# Patient Record
Sex: MALE | Race: WHITE | NOT HISPANIC OR LATINO | Employment: OTHER | RURAL
[De-identification: names, ages, dates, MRNs, and addresses within clinical notes are randomized per-mention and may not be internally consistent; named-entity substitution may affect disease eponyms.]

---

## 2019-10-04 ENCOUNTER — TRANSCRIBE ORDERS (OUTPATIENT)
Dept: PHYSICAL THERAPY | Facility: CLINIC | Age: 74
End: 2019-10-04

## 2019-10-04 DIAGNOSIS — M54.50 LOW BACK PAIN, UNSPECIFIED BACK PAIN LATERALITY, UNSPECIFIED CHRONICITY, UNSPECIFIED WHETHER SCIATICA PRESENT: Primary | ICD-10-CM

## 2019-10-21 ENCOUNTER — TREATMENT (OUTPATIENT)
Dept: PHYSICAL THERAPY | Facility: CLINIC | Age: 74
End: 2019-10-21

## 2019-10-21 DIAGNOSIS — M54.50 CHRONIC LOW BACK PAIN, UNSPECIFIED BACK PAIN LATERALITY, UNSPECIFIED WHETHER SCIATICA PRESENT: Primary | ICD-10-CM

## 2019-10-21 DIAGNOSIS — G89.29 CHRONIC LOW BACK PAIN, UNSPECIFIED BACK PAIN LATERALITY, UNSPECIFIED WHETHER SCIATICA PRESENT: Primary | ICD-10-CM

## 2019-10-21 PROCEDURE — 97110 THERAPEUTIC EXERCISES: CPT | Performed by: PHYSICAL THERAPIST

## 2019-10-21 PROCEDURE — 97162 PT EVAL MOD COMPLEX 30 MIN: CPT | Performed by: PHYSICAL THERAPIST

## 2019-10-21 NOTE — PROGRESS NOTES
Physical Therapy Initial Evaluation and Plan of Care        Patient: Giles Parker   : 1945  Diagnosis/ICD-10 Code:  Chronic low back pain, unspecified back pain laterality, unspecified whether sciatica present [M54.5, G89.29]  Referring practitioner: Nury Ozuna PA-C  Date of Initial Visit: 10/21/2019  Today's Date: 10/21/2019  Patient seen for 1 sessions    Next MD appt: PRN .  Recertification: 2019         Subjective Questionnaire: Oswestry: 23/50 = 46%      Subjective Evaluation    History of Present Illness  Onset date: Chronic, >20 years.  Mechanism of injury: None recent.    Subjective comment: Patient reports he has hd always had low back issues. He reports he had sciatic nerve pain for about 10 years. he reports now he has pain in his R flank and next to his spine. He reports he has neverhad recent PT for his back, but sees the chiropractor x2 in 1 week and it helps while he is there biut effects aren't lasitng so he quite going. He reports he is going back to a local chiropractor here again.  Patient Occupation: Retired Quality of life: fair    Pain  Current pain ratin  At best pain ratin  At worst pain ratin  Location: Low back  Quality: tight, pulling and discomfort  Aggravating factors: ambulation, prolonged positioning and movement    Social Support  Lives in: one-story house  Lives with: spouse    Diagnostic Tests  X-ray: abnormal    Treatments  Previous treatment: chiropractic and physical therapy  Patient Goals  Patient goals for therapy: decreased pain, increased motion, increased strength and independence with ADLs/IADLs  Patient goal: Patient would like to get rid of the pain in the back and be able to walk without it bothering him.           Objective       Static Posture     Head  Forward.    Shoulders  Rounded.    Thoracic Spine  Hyperkyphosis.    Lumbar Spine   Flattened.     Pelvis   Pelvis (Right): Shifted.     Comments  R anterior ROT innominate,  "1/4\" short on R LE.    Postural Observations  Seated posture: poor  Standing posture: fair        Palpation     Right Tenderness of the rectus abdominus.     Neurological Testing     Sensation     Lumbar   Left   Intact: hot/cold discrimination  Diminished: light touch    Right   Intact: hot/cold discrimination  Diminished: light touch    Reflexes   Left   Patellar (L4): trace (1+)  Achilles (S1): absent (0)    Right   Patellar (L4): trace (1+)  Achilles (S1): absent (0)    Additional Neurological Details  neuropaythy B LEs    Active Range of Motion     Lumbar   Flexion: 80 degrees   Extension: 10 degrees   Left lateral flexion: Active left lumbar lateral flexion: 50% of range. with pain  Right lateral flexion: Active right lumbar lateral flexion: 50% of range.   Left rotation: Active left lumbar rotation: 75% of range.   Right rotation: Active right lumbar rotation: 50% of range.     Strength/Myotome Testing     Left Hip   Planes of Motion   Flexion: 4  Extension: 4-  Abduction: 4+  Adduction: 4+    Right Hip   Planes of Motion   Flexion: 4-  Extension: 4-  Abduction: 4+  Adduction: 4+    Left Knee   Flexion: 5  Extension: 4+    Right Knee   Flexion: 5  Extension: 5    Tests     Lumbar     Left   Negative crossed SLR, passive SLR, quadrant and valsalva.     Right   Negative crossed SLR, passive SLR, quadrant and valsalva.     Left Pelvic Girdle/Sacrum   Negative: sacrum compression and gapping.     Right Pelvic Girdle/Sacrum   Negative: sacrum compression and gapping.     Left Hip   Negative YOLY, FADIR, piriformis, SI compression and SI distraction.   SLR: Negative.     Right Hip   Positive YOLY and SI compression.   Negative FADIR, piriformis and SI distraction.   SLR: Negative.     Additional Tests Details  Karri's Symptoms  1) Tenderness- negative  2) Simulation- negative  3) Distraction- negative  4) Regional- negative  5) Overreaction- negative    Ambulation   Weight-Bearing Status   Weight-Bearing Status " "(Left): weight-bearing as tolerated   Weight-Bearing Status (Right): weight-bearing as tolerated    Assistive device used: none    Quality of Movement During Gait   Trunk  Forward lean.     Comments   FWB, non-antalgic gait, no distress, no significant gait deviation.         Assessment & Plan     Assessment  Impairments: abnormal gait, abnormal or restricted ROM, activity intolerance, lacks appropriate home exercise program and pain with function  Assessment details: Patient presents with chronic low back pain, has had chiropractic treatment sin the past with no real changes but has currently started seeing a chiropractor again. Today had a R anterior ROT innominate which corrected easily with HS MET. Patient was also issued HEP today. Patient defers aquatics.  Prognosis: fair  Prognosis details: Barriers to Rehab: Include significant or possible arthritic/degenerative changes that have occurred within the spine, The chronicity of this issue, The patient's obesity, The patient's generally deconditioned state.    Safety Issues: Cancer history, No estim/US    Functional Limitations: carrying objects, lifting, sleeping, uncomfortable because of pain, sitting, standing and unable to perform repetitive tasks  Goals  Plan Goals: Short Term Goals:  1) I with HEP and have additions/changes by next recertification    2) Patient able to show proper log roll technique.    3) Patient to be more aware of posture and posture correction techniques    4) AROM for lumbar extension >= 20°     5) AROm B Lumbar SB/ROT >= 75% of range.      Long Term Goals:  1) Patient to have AROM for the lumbar spine all WNL, no increase in pain (extension >= 20°).    2) B LE 5/5.    3) Patient able to perform 20 Bridges with UE \"X\"  with no increase in pain.    4) Patient able to perform 5 minutes of seated DD activity with no increas ein pain and good posture.    5) Patient able to show proper lifting technique floor to waist with no increase in " pain.    6) Patient able to show proper ergonomics for lawn care activities.    7) I with final land HEP.    8) D/C with a final HEP and free 30 day fitness formula [facility]      Plan  Therapy options: will be seen for skilled physical therapy services  Planned modality interventions: cryotherapy and thermotherapy (hydrocollator packs)  Planned therapy interventions: abdominal trunk stabilization, body mechanics training, flexibility, functional ROM exercises, home exercise program, transfer training, therapeutic activities, stretching, strengthening, spinal/joint mobilization, soft tissue mobilization, postural training, manual therapy and IADL retraining  Duration in visits: 20  Treatment plan discussed with: patient  Plan details: Progress ROM, strength, core stab, body mechanics to an I HEP that the patient can continue for long term pain management and spinal protection.       Other therapeutic activities and/or exercises will be prescribed depending on the patients progress or lack there of.    Visit Diagnoses:    ICD-10-CM ICD-9-CM   1. Chronic low back pain, unspecified back pain laterality, unspecified whether sciatica present M54.5 724.2    G89.29 338.29       Timed:  Manual Therapy:         mins  27967;  Therapeutic Exercise:    14     mins  05122;     Neuromuscular Kisha:        mins  62390;    Therapeutic Activity:          mins  58681;     Gait Training:           mins  81850;     Ultrasound:          mins  96976;    Electrical Stimulation:         mins  32282 ( );    Untimed:  Electrical Stimulation:         mins  85869 ( );  Mechanical Traction:         mins  30280;     Timed Treatment:   14   mins   Total Treatment:     58   mins    PT SIGNATURE: Geno Gifford PT DPT, [facility]   DATE TREATMENT INITIATED: 10/21/2019    Initial Certification  Certification Period: 1/19/2020  I certify that the therapy services are furnished while this patient is under my care.  The services  outlined above are required by this patient, and will be reviewed every 90 days.     PHYSICIAN: Nury Ozuna PA-C      DATE:     Please sign and return via fax to  .. Thank you, [facility].

## 2019-10-23 ENCOUNTER — TREATMENT (OUTPATIENT)
Dept: PHYSICAL THERAPY | Facility: CLINIC | Age: 74
End: 2019-10-23

## 2019-10-23 DIAGNOSIS — M54.50 CHRONIC LOW BACK PAIN, UNSPECIFIED BACK PAIN LATERALITY, UNSPECIFIED WHETHER SCIATICA PRESENT: Primary | ICD-10-CM

## 2019-10-23 DIAGNOSIS — G89.29 CHRONIC LOW BACK PAIN, UNSPECIFIED BACK PAIN LATERALITY, UNSPECIFIED WHETHER SCIATICA PRESENT: Primary | ICD-10-CM

## 2019-10-23 PROCEDURE — 97110 THERAPEUTIC EXERCISES: CPT | Performed by: PHYSICAL THERAPIST

## 2019-10-23 NOTE — PROGRESS NOTES
"Physical Therapy Daily Progress Note    Patient: Giles Parker   : 1945  Diagnosis/ICD-10 Code:     Diagnosis Plan   1. Chronic low back pain, unspecified back pain laterality, unspecified whether sciatica present       Referring practitioner: Nury Ozuna PA-C  Date of Initial Visit: Type: THERAPY  Noted: 10/21/2019  Today's Date: 10/23/2019  Patient seen for 2 sessions      PT Recheck Due: 2019  PT MD Visit: PRN    CANCER HISTORY- NO ESTIM or US       Giles Parker        Subjective Evaluation    History of Present Illness    Subjective comment: denies pain today but states that he is sore from the exercising, states that he can really feel that he did something the other day. reports that he usually rides the Pro II bike every day for 30 minutes for cardiovascular and pulmonary health Pain  Current pain ratin             Objective   See Exercise, Manual, and Modality Logs for complete treatment.       Assessment & Plan     Assessment  Assessment details: Pt instructed to arrive to treatment early so that he can complete his normal routine of riding the PRO II Bike for 30 minutes for his cardiovascular and pulmonary health each visit. Added tband hip abd and low level bridges as well as BKLL to current HEP program. Good effort throughout. L LE appears shorter with L anterior rotation of ASIS. Easily corrects with HS MET on L LE    Goals  Plan Goals: Short Term Goals:  1) I with HEP and have additions/changes by next recertification (met)    2) Patient able to show proper log roll technique. (met)    3) Patient to be more aware of posture and posture correction techniques    4) AROM for lumbar extension >= 20°     5) AROm B Lumbar SB/ROT >= 75% of range.      Long Term Goals:  1) Patient to have AROM for the lumbar spine all WNL, no increase in pain (extension >= 20°).    2) B LE 5/5.    3) Patient able to perform 20 Bridges with UE \"X\"  with no increase in pain.    4) Patient able to " perform 5 minutes of seated DD activity with no increas ein pain and good posture.    5) Patient able to show proper lifting technique floor to waist with no increase in pain.    6) Patient able to show proper ergonomics for lawn care activities.    7) I with final land HEP.    8) D/C with a final HEP and free 30 day fitness formula [employer]    Plan  Plan details: Continue to progress core stabilization exercises. Add sit to/from stand with Lx extension       Progress strengthening /stabilization /functional activity            Timed:  Manual Therapy:         mins  53975;  Therapeutic Exercise:    45     mins  51375;   Aquatic Therex :        mins  06061    Neuromuscular Kisha:        mins  60728;    Therapeutic Activity:          mins  14389;     Gait Training:           mins  57984;     Ultrasound:          mins  68954;    Electrical Stimulation:         mins  77424 ( );    Untimed:  Electrical Stimulation:         mins  65592 ( );  Mechanical Traction:         mins  78313;     Timed Treatment:   45   mins   Total Treatment:     45   mins  Maribeth Botello PTA  Physical Therapist Assistant

## 2019-10-29 ENCOUNTER — TREATMENT (OUTPATIENT)
Dept: PHYSICAL THERAPY | Facility: CLINIC | Age: 74
End: 2019-10-29

## 2019-10-29 DIAGNOSIS — M54.50 CHRONIC LOW BACK PAIN, UNSPECIFIED BACK PAIN LATERALITY, UNSPECIFIED WHETHER SCIATICA PRESENT: Primary | ICD-10-CM

## 2019-10-29 DIAGNOSIS — G89.29 CHRONIC LOW BACK PAIN, UNSPECIFIED BACK PAIN LATERALITY, UNSPECIFIED WHETHER SCIATICA PRESENT: Primary | ICD-10-CM

## 2019-10-29 PROCEDURE — 97110 THERAPEUTIC EXERCISES: CPT | Performed by: PHYSICAL THERAPIST

## 2019-10-29 NOTE — PROGRESS NOTES
"   Physical Therapy Daily Progress Note      Patient: Giles Parker   : 1945  Referring practitioner: Nury Ozuna PA-C  Date of Initial Visit: Type: THERAPY  Noted: 10/21/2019  Today's Date: 10/29/2019  Patient seen for 3 sessions    Next MD appt: PRN .  Recertification: 2019           Subjective     Objective   See Exercise, Manual, and Modality Logs for complete treatment.       Assessment & Plan     Assessment  Assessment details: Pt has met 1 and 2 short term goals. No new goals met at this time. Pt progressing toward bridging goal and working on upright posturing. Pt tolerated new ex on esha disc this date with LAQ and trunk rotation, shoulder rows.    Goals  Plan Goals: Plan Goals: Short Term Goals:  1) I with HEP and have additions/changes by next recertification (met)    2) Patient able to show proper log roll technique. (met)    3) Patient to be more aware of posture and posture correction techniques (progressing)    4) AROM for lumbar extension >= 20° (ongoing/progressing)    5) AROm B Lumbar SB/ROT >= 75% of range.(ongoing/progressing)      Long Term Goals:  1) Patient to have AROM for the lumbar spine all WNL, no increase in pain (extension >= 20°).    2) B LE 5/5.    3) Patient able to perform 20 Bridges with UE \"X\"  with no increase in pain.(progressing/ongoing)    4) Patient able to perform 5 minutes of seated DD activity with no increas ein pain and good posture.(progressing/ongoing)    5) Patient able to show proper lifting technique floor to waist with no increase in pain.    6) Patient able to show proper ergonomics for lawn care activities.    7) I with final land HEP.    8) D/C with a final HEP and free 30 day fitness formula [employer]     Plan  Plan details: Add marching to esha disc         Visit Diagnoses:    ICD-10-CM ICD-9-CM   1. Chronic low back pain, unspecified back pain laterality, unspecified whether sciatica present M54.5 724.2    G89.29 338.29 "       Progress per Plan of Care and Progress strengthening /stabilization /functional activity           Timed:  Manual Therapy:         mins  60541;  Therapeutic Exercise:    54     mins  56155;     Neuromuscular Kisha:        mins  97219;    Therapeutic Activity:          mins  68130;     Gait Training:           mins  04048;     Ultrasound:          mins  40126;    Electrical Stimulation:         mins  72017 ( );    Untimed:  Electrical Stimulation:         mins  95483 ( );  Mechanical Traction:         mins  22299;     Timed Treatment:  54    mins   Total Treatment:     54   mins  Pat Weller PTA  Physical Therapist Assistant

## 2019-10-31 ENCOUNTER — TREATMENT (OUTPATIENT)
Dept: PHYSICAL THERAPY | Facility: CLINIC | Age: 74
End: 2019-10-31

## 2019-10-31 DIAGNOSIS — M54.50 CHRONIC LOW BACK PAIN, UNSPECIFIED BACK PAIN LATERALITY, UNSPECIFIED WHETHER SCIATICA PRESENT: Primary | ICD-10-CM

## 2019-10-31 DIAGNOSIS — G89.29 CHRONIC LOW BACK PAIN, UNSPECIFIED BACK PAIN LATERALITY, UNSPECIFIED WHETHER SCIATICA PRESENT: Primary | ICD-10-CM

## 2019-10-31 PROCEDURE — 97110 THERAPEUTIC EXERCISES: CPT | Performed by: PHYSICAL THERAPIST

## 2019-10-31 NOTE — PROGRESS NOTES
"   Physical Therapy Daily Progress Note      Patient: Giles Parker   : 1945  Referring practitioner: Nury Ozuna PA-C  Date of Initial Visit: Type: THERAPY  Noted: 10/21/2019  Today's Date: 10/31/2019  Patient seen for 4 sessions    Next MD appt: PRN .  Recertification: 2019         Giles Parker reports: \"some\" improvement.        Subjective Evaluation    History of Present Illness    Subjective comment: Patient rpeorts no real pain today, mainly stiffness.Pain  Current pain ratin           Objective   See Exercise, Manual, and Modality Logs for complete treatment.       Assessment & Plan     Assessment  Assessment details: Improving overall overall postural awareness. Cueing needed to activate middle and lower traps versus upper traps with scap stab/posture there ex. Improving overall postural awareness with less cueing needed.    Goals  Plan Goals: Plan Goals: Short Term Goals:  1) I with HEP and have additions/changes by next recertification (met)    2) Patient able to show proper log roll technique. (met)    3) Patient to be more aware of posture and posture correction techniques (partially met/progressing)    4) AROM for lumbar extension >= 20° (ongoing/progressing)    5) AROm B Lumbar SB/ROT >= 75% of range.(ongoing/progressing)      Long Term Goals:  1) Patient to have AROM for the lumbar spine all WNL, no increase in pain (extension >= 20°).    2) B LE 5/5.    3) Patient able to perform 20 Bridges with UE \"X\"  with no increase in pain.(progressing/ongoing)    4) Patient able to perform 5 minutes of seated DD activity with no increase in pain and good posture.(met/ongoing)    5) Patient able to show proper lifting technique floor to waist with no increase in pain.    6) Patient able to show proper ergonomics for lawn care activities.    7) I with final land HEP.    8) D/C with a final HEP and free 30 day fitness formula [employer]     Plan  Plan details: Add semi-reclined " Nico MONTANEZ next session.        Visit Diagnoses:    ICD-10-CM ICD-9-CM   1. Chronic low back pain, unspecified back pain laterality, unspecified whether sciatica present M54.5 724.2    G89.29 338.29       Progress strengthening /stabilization /functional activity           Timed:  Manual Therapy:         mins  13686;  Therapeutic Exercise:    54     mins  32589;     Neuromuscular Kisha:        mins  59054;    Therapeutic Activity:          mins  05693;     Gait Training:           mins  02484;     Ultrasound:          mins  65311;    Electrical Stimulation:         mins  10797 ( );    Untimed:  Electrical Stimulation:         mins  31834 ( );  Mechanical Traction:         mins  79134;     Timed Treatment:   54   mins   Total Treatment:     54   mins  Geno Gifford PT DPT, CSCS  Physical Therapist

## 2019-11-05 ENCOUNTER — TREATMENT (OUTPATIENT)
Dept: PHYSICAL THERAPY | Facility: CLINIC | Age: 74
End: 2019-11-05

## 2019-11-05 DIAGNOSIS — G89.29 CHRONIC LOW BACK PAIN, UNSPECIFIED BACK PAIN LATERALITY, UNSPECIFIED WHETHER SCIATICA PRESENT: Primary | ICD-10-CM

## 2019-11-05 DIAGNOSIS — M54.50 CHRONIC LOW BACK PAIN, UNSPECIFIED BACK PAIN LATERALITY, UNSPECIFIED WHETHER SCIATICA PRESENT: Primary | ICD-10-CM

## 2019-11-05 PROCEDURE — 97110 THERAPEUTIC EXERCISES: CPT | Performed by: PHYSICAL THERAPIST

## 2019-11-05 NOTE — PROGRESS NOTES
Physical Therapy Daily Progress Note    Patient: Giles Parker   : 1945  Diagnosis/ICD-10 Code:     Diagnosis Plan   1. Chronic low back pain, unspecified back pain laterality, unspecified whether sciatica present       Referring practitioner: Nury Ozuna PA-C  Date of Initial Visit: Type: THERAPY  Noted: 10/21/2019  Today's Date: 2019  Patient seen for 5 sessions      PT Recheck Due: 2019  PT MD Visit: PRN    CANCER HISTORY- NO ESTIM or US       Giles Parker reports: he is able to manage his symptoms with minimal difficulty        Subjective Evaluation    History of Present Illness    Subjective comment: having a little back soreness today.          Objective       Active Range of Motion     Lumbar   Extension: 10 degrees   Left lateral flexion: Active left lumbar lateral flexion: AROM Left lateral side bend 50% of range.   Right lateral flexion: Active right lumbar lateral flexion: AROM Right lateral side bend 50% of range.   Left rotation: Active left lumbar rotation: AROM Left Rotation 100% of range.   Right rotation: Active right lumbar rotation: AROM Rightt Rotation 100% of range.      See Exercise, Manual, and Modality Logs for complete treatment.       Assessment & Plan     Assessment  Assessment details: Patient has no change in AROM Lx extension at this time but has improved AROM Lx Rotation. Has no change in AROM Lateral flexion/sidebending. Good log roll technique.     Goals  Plan Goals: Short Term Goals:  1) I with HEP and have additions/changes by next recertification (met)    2) Patient able to show proper log roll technique. (met)    3) Patient to be more aware of posture and posture correction techniques (met)    4) AROM for lumbar extension >= 20° (ongoing/progressing)    5) AROm B Lumbar SB/ROT >= 75% of range.(met for rotation)      Long Term Goals:  1) Patient to have AROM for the lumbar spine all WNL, no increase in pain (extension >= 20°).    2) B LE .    3)  "Patient able to perform 20 Bridges with UE \"X\"  with no increase in pain.(met)    4) Patient able to perform 5 minutes of seated DD activity with no increase in pain and good posture.(met)    5) Patient able to show proper lifting technique floor to waist with no increase in pain.    6) Patient able to show proper ergonomics for lawn care activities.    7) I with final land HEP.    8) D/C with a final HEP and free 30 day fitness formula [employer]     Plan  Plan details: Next visit recheck and discharge      Progress strengthening /stabilization /functional activity and Anticipate DC next Visit            Timed:  Manual Therapy:         mins  88471;  Therapeutic Exercise:    45     mins  29561;   Aquatic Therex :        mins  40379    Neuromuscular Kisha:        mins  47385;    Therapeutic Activity:          mins  41456;     Gait Training:           mins  95980;     Ultrasound:          mins  13656;    Electrical Stimulation:         mins  60642 ( );    Untimed:  Electrical Stimulation:         mins  50233 ( );  Mechanical Traction:         mins  21641;     Timed Treatment:   45   mins   Total Treatment:     45   mins  Maribeth Botello PTA  Physical Therapist Assistant  "

## 2019-11-08 ENCOUNTER — TREATMENT (OUTPATIENT)
Dept: PHYSICAL THERAPY | Facility: CLINIC | Age: 74
End: 2019-11-08

## 2019-11-08 DIAGNOSIS — G89.29 CHRONIC LOW BACK PAIN, UNSPECIFIED BACK PAIN LATERALITY, UNSPECIFIED WHETHER SCIATICA PRESENT: Primary | ICD-10-CM

## 2019-11-08 DIAGNOSIS — M54.50 CHRONIC LOW BACK PAIN, UNSPECIFIED BACK PAIN LATERALITY, UNSPECIFIED WHETHER SCIATICA PRESENT: Primary | ICD-10-CM

## 2019-11-08 PROCEDURE — 97535 SELF CARE MNGMENT TRAINING: CPT | Performed by: PHYSICAL THERAPIST

## 2019-11-08 PROCEDURE — 97110 THERAPEUTIC EXERCISES: CPT | Performed by: PHYSICAL THERAPIST

## 2019-11-08 NOTE — PROGRESS NOTES
"   Physical Therapy Daily Progress Note      Patient: Giles Parker   : 1945  Referring practitioner: Nury Ozuna PA-C  Date of Initial Visit: Type: THERAPY  Noted: 10/21/2019  Today's Date: 2019  Patient seen for 6 sessions    Next MD appt: PRN .  Recertification: 2019             Subjective     Objective   See Exercise, Manual, and Modality Logs for complete treatment.       Assessment & Plan     Assessment  Assessment details: Pt educated on proper lifting and ergonomics with outside work. Pt verbalzied and demonstrated understanding of instructions. Pt has met short term goals 1-3 and partial met 5 short term goal. Pt also met LTGs 3-6. Possible d/c next visit.    Goals  Plan Goals: Goals  Plan Goals: Short Term Goals:  1) I with HEP and have additions/changes by next recertification (met)    2) Patient able to show proper log roll technique. (met)    3) Patient to be more aware of posture and posture correction techniques (met)    4) AROM for lumbar extension >= 20° (ongoing/progressing)    5) AROm B Lumbar SB/ROT >= 75% of range.(met for rotation)      Long Term Goals:  1) Patient to have AROM for the lumbar spine all WNL, no increase in pain (extension >= 20°).    2) B LE 5/5.    3) Patient able to perform 20 Bridges with UE \"X\"  with no increase in pain.(met)    4) Patient able to perform 5 minutes of seated DD activity with no increase in pain and good posture.(met)    5) Patient able to show proper lifting technique floor to waist with no increase in pain.(met)    6) Patient able to show proper ergonomics for lawn care activities.(met)    7) I with final land HEP.(progressing)    8) D/C with a final HEP and free 30 day fitness formula [employer]  (progressing)    Plan  Plan details: Check measurements and possible d/c        Visit Diagnoses:    ICD-10-CM ICD-9-CM   1. Chronic low back pain, unspecified back pain laterality, unspecified whether sciatica present M54.5 " 724.2    G89.29 338.29                  Timed:  Manual Therapy:         mins  76013;  Therapeutic Exercise:      33   mins  25974;     Neuromuscular Kisha:        mins  81432;    Therapeutic Activity:          mins  88371;     Gait Training:           mins  72954;     Ultrasound:          mins  83053;    Electrical Stimulation:         mins  29727 ( );  Self management ed   10mins  Untimed:  Electrical Stimulation:         mins  14053 ( );  Mechanical Traction:         mins  52207;     Timed Treatment:    43  mins   Total Treatment:     43   mins  Pat Weller PTA  Physical Therapist Assistant

## 2019-11-14 ENCOUNTER — TREATMENT (OUTPATIENT)
Dept: PHYSICAL THERAPY | Facility: CLINIC | Age: 74
End: 2019-11-14

## 2019-11-14 DIAGNOSIS — M54.50 CHRONIC LOW BACK PAIN, UNSPECIFIED BACK PAIN LATERALITY, UNSPECIFIED WHETHER SCIATICA PRESENT: Primary | ICD-10-CM

## 2019-11-14 DIAGNOSIS — G89.29 CHRONIC LOW BACK PAIN, UNSPECIFIED BACK PAIN LATERALITY, UNSPECIFIED WHETHER SCIATICA PRESENT: Primary | ICD-10-CM

## 2019-11-14 PROCEDURE — 97110 THERAPEUTIC EXERCISES: CPT | Performed by: PHYSICAL THERAPIST

## 2019-11-14 NOTE — PROGRESS NOTES
Re-Assessment / Re-Certification/Discharge      Patient: Giles Parker   : 1945  Diagnosis/ICD-10 Code:  Chronic low back pain, unspecified back pain laterality, unspecified whether sciatica present [M54.5, G89.29]  Referring practitioner: Nury Ozuna PA-C  Date of Initial Visit: Type: THERAPY  Noted: 10/21/2019  Today's Date: 2019  Patient seen for 7 sessions      Subjective:   Giles Parker reports: % improvement  Subjective Questionnaire: Oswestry: 2550 = 50%  Clinical Progress: improved  Home Program Compliance: Yes  Treatment has included: therapeutic exercise, neuromuscular re-education, therapeutic activity, moist heat and cryotherapy    Subjective Evaluation    History of Present Illness    Subjective comment: Patient reports he is doing well and the pain he had when he started is gone.Pain  No pain reported         Objective       Static Posture     Head  Forward.    Shoulders  Rounded.    Thoracic Spine  Hyperkyphosis.    Lumbar Spine   Flattened.     Comments  Level/Sacrum pelvis is level, no LLD to note    Postural Observations  Seated posture: fair  Standing posture: fair        Palpation     Right Tenderness of the rectus abdominus.     Neurological Testing     Sensation     Lumbar   Left   Intact: hot/cold discrimination  Diminished: light touch    Right   Intact: hot/cold discrimination  Diminished: light touch    Reflexes   Left   Patellar (L4): trace (1+)  Achilles (S1): absent (0)    Right   Patellar (L4): trace (1+)  Achilles (S1): absent (0)    Additional Neurological Details  neuropaythy B LEs    Active Range of Motion     Lumbar   Flexion: 85 degrees   Extension: 20 degrees   Left lateral flexion: WFL  Right lateral flexion: WFL  Left rotation: WFL  Right rotation: WFL    Strength/Myotome Testing     Lumbar   Left   Normal strength    Right   Normal strength    Left Hip   Planes of Motion   Flexion: 5  Extension: 5  Abduction: 5  Adduction: 5    Right Hip  "  Planes of Motion   Flexion: 5  Extension: 5  Abduction: 5  Adduction: 5    Left Knee   Flexion: 5  Extension: 5    Right Knee   Flexion: 5  Extension: 5    Tests     Lumbar     Left   Negative crossed SLR, passive SLR, quadrant and valsalva.     Right   Negative crossed SLR, passive SLR, quadrant and valsalva.     Left Pelvic Girdle/Sacrum   Negative: sacrum compression and gapping.     Right Pelvic Girdle/Sacrum   Negative: sacrum compression and gapping.     Left Hip   Negative YOLY, FADIR, piriformis, SI compression and SI distraction.   SLR: Negative.     Right Hip   Negative YOLY, FADIR, piriformis, SI compression and SI distraction.   SLR: Negative.     Additional Tests Details  Karri's Symptoms  1) Tenderness- negative  2) Simulation- negative  3) Distraction- negative  4) Regional- negative  5) Overreaction- negative    Ambulation   Weight-Bearing Status   Weight-Bearing Status (Left): weight-bearing as tolerated   Weight-Bearing Status (Right): weight-bearing as tolerated    Assistive device used: none    Quality of Movement During Gait   Trunk  Forward lean.     Comments   FWB, non-antalgic gait, no distress, no significant gait deviation.     Assessment & Plan       Goals  Plan Goals: Goals  Plan Goals: Short Term Goals:  1) I with HEP and have additions/changes by next recertification (met)    2) Patient able to show proper log roll technique. (met)    3) Patient to be more aware of posture and posture correction techniques (met)    4) AROM for lumbar extension >= 20° (met)    5) AROm B Lumbar SB/ROT >= 75% of range.(met)      Long Term Goals:  1) Patient to have AROM for the lumbar spine all WNL, no increase in pain (met).    2) B LE 5/5. (met)    3) Patient able to perform 20 Bridges with UE \"X\"  with no increase in pain.(met)    4) Patient able to perform 5 minutes of seated DD activity with no increase in pain and good posture.(met)    5) Patient able to show proper lifting technique floor to " waist with no increase in pain.(met)    6) Patient able to show proper ergonomics for lawn care activities.(met)    7) I with final land HEP. (met)    8) D/C with a final HEP and free 30 day fitness formula mememberip  (met)    Plan  Plan details: Check measurements and possible d/c        Visit Diagnoses:    ICD-10-CM ICD-9-CM   1. Chronic low back pain, unspecified back pain laterality, unspecified whether sciatica present M54.5 724.2    G89.29 338.29       Progress toward previous goals: All Met        Recommendations: Discharge    Prognosis to achieve goals: good    PT Signature: Geno Gifford, PT DPT, CSCS        Timed:  Manual Therapy:         mins  00078;  Therapeutic Exercise:    41     mins  57012;     Neuromuscular Kisha:        mins  91946;    Therapeutic Activity:          mins  67542;     Gait Training:           mins  68556;     Ultrasound:          mins  75733;    Electrical Stimulation:         mins  40840 ( );    Untimed:  Electrical Stimulation:         mins  80648 ( );  Mechanical Traction:         mins  07802;     Timed Treatment:   41   mins   Total Treatment:     41   mins

## 2022-05-25 ENCOUNTER — OFFICE VISIT (OUTPATIENT)
Dept: FAMILY MEDICINE CLINIC | Facility: CLINIC | Age: 77
End: 2022-05-25

## 2022-05-25 VITALS
SYSTOLIC BLOOD PRESSURE: 110 MMHG | HEART RATE: 103 BPM | WEIGHT: 248.6 LBS | OXYGEN SATURATION: 93 % | DIASTOLIC BLOOD PRESSURE: 58 MMHG | BODY MASS INDEX: 35.59 KG/M2 | TEMPERATURE: 98.1 F | HEIGHT: 70 IN

## 2022-05-25 DIAGNOSIS — R50.9 FEVER, UNSPECIFIED FEVER CAUSE: ICD-10-CM

## 2022-05-25 DIAGNOSIS — R22.0 MASS OF RIGHT SUBMANDIBULAR REGION: ICD-10-CM

## 2022-05-25 DIAGNOSIS — R05.9 COUGH: ICD-10-CM

## 2022-05-25 DIAGNOSIS — J44.0 COPD WITH LOWER RESPIRATORY INFECTION: Primary | Chronic | ICD-10-CM

## 2022-05-25 DIAGNOSIS — R06.02 SHORTNESS OF BREATH: ICD-10-CM

## 2022-05-25 DIAGNOSIS — E11.9 TYPE 2 DIABETES MELLITUS WITHOUT COMPLICATION, WITHOUT LONG-TERM CURRENT USE OF INSULIN: Chronic | ICD-10-CM

## 2022-05-25 LAB
EXPIRATION DATE: NORMAL
FLUAV AG UPPER RESP QL IA.RAPID: NOT DETECTED
FLUBV AG UPPER RESP QL IA.RAPID: NOT DETECTED
INTERNAL CONTROL: NORMAL
Lab: NORMAL
SARS-COV-2 AG UPPER RESP QL IA.RAPID: NOT DETECTED

## 2022-05-25 PROCEDURE — 99214 OFFICE O/P EST MOD 30 MIN: CPT | Performed by: NURSE PRACTITIONER

## 2022-05-25 PROCEDURE — 87428 SARSCOV & INF VIR A&B AG IA: CPT | Performed by: NURSE PRACTITIONER

## 2022-05-25 PROCEDURE — 96372 THER/PROPH/DIAG INJ SC/IM: CPT | Performed by: NURSE PRACTITIONER

## 2022-05-25 RX ORDER — METHYLPREDNISOLONE SODIUM SUCCINATE 40 MG/ML
40 INJECTION, POWDER, LYOPHILIZED, FOR SOLUTION INTRAMUSCULAR; INTRAVENOUS ONCE
Status: COMPLETED | OUTPATIENT
Start: 2022-05-25 | End: 2022-05-25

## 2022-05-25 RX ORDER — PREDNISONE 20 MG/1
20 TABLET ORAL DAILY
Qty: 5 TABLET | Refills: 0 | Status: SHIPPED | OUTPATIENT
Start: 2022-05-25 | End: 2022-06-01

## 2022-05-25 RX ORDER — FLUTICASONE PROPIONATE 50 MCG
SPRAY, SUSPENSION (ML) NASAL
COMMUNITY

## 2022-05-25 RX ORDER — MAGNESIUM OXIDE 420 MG/1
TABLET ORAL EVERY 12 HOURS SCHEDULED
COMMUNITY

## 2022-05-25 RX ORDER — AZITHROMYCIN 250 MG/1
TABLET, FILM COATED ORAL
Qty: 6 TABLET | Refills: 0 | Status: SHIPPED | OUTPATIENT
Start: 2022-05-25 | End: 2022-06-01

## 2022-05-25 RX ORDER — GABAPENTIN 600 MG/1
TABLET ORAL EVERY 8 HOURS SCHEDULED
COMMUNITY
End: 2022-05-25 | Stop reason: SDUPTHER

## 2022-05-25 RX ORDER — CEFTRIAXONE 1 G/1
1 INJECTION, POWDER, FOR SOLUTION INTRAMUSCULAR; INTRAVENOUS ONCE
Status: COMPLETED | OUTPATIENT
Start: 2022-05-25 | End: 2022-05-25

## 2022-05-25 RX ORDER — ALBUTEROL SULFATE 2.5 MG/3ML
2.5 SOLUTION RESPIRATORY (INHALATION) EVERY 4 HOURS PRN
Qty: 150 ML | Refills: 0 | Status: SHIPPED | OUTPATIENT
Start: 2022-05-25

## 2022-05-25 RX ORDER — OMEPRAZOLE 40 MG/1
CAPSULE, DELAYED RELEASE ORAL
COMMUNITY

## 2022-05-25 RX ADMIN — METHYLPREDNISOLONE SODIUM SUCCINATE 40 MG: 40 INJECTION, POWDER, LYOPHILIZED, FOR SOLUTION INTRAMUSCULAR; INTRAVENOUS at 14:54

## 2022-05-25 RX ADMIN — CEFTRIAXONE 1 G: 1 INJECTION, POWDER, FOR SOLUTION INTRAMUSCULAR; INTRAVENOUS at 14:53

## 2022-05-25 NOTE — PROGRESS NOTES
"Chief Complaint  Illness (Cough, drainage, sinus, diarrhea started Monday.  States he feels dehydrated./Ran fever 2 days ago.)    Subjective          Giles Parker presents to [facility] - [facility]    [facility]    CC: \"cough, drainage, fever\"    [facility] at the [facility].  Reports hx of COPD, seen by [facility]ology through the [facility].  On home O2 @ night and as needed during the day.  Normal saturation runs 91-92%.  No known exposures.  Non smoker--quit after dx of colorectal cancer in 2007. Diabetic, last A1C 6.1.    C/O swelling to right mandible region for a couple of months.  [facility].  Gets better, but then gets larger again.  Hasn't had any US yet, [facility] with [facility].     Illness  This is a new problem. Episode onset: 5-23. The problem occurs daily. The problem has been gradually improving (fever has resolved, but cough/chest congestion still present). Associated symptoms include a change in bowel habit (one loose stool per day), chills, congestion, coughing, fatigue, a fever (none in 24 hours) and myalgias. Pertinent negatives include no abdominal pain, anorexia, arthralgias, chest pain, diaphoresis, headaches, joint swelling, nausea, neck pain, numbness, rash, sore throat, swollen glands, urinary symptoms, vertigo, visual change, vomiting or weakness. Exacerbated by: activity makes dyspnea worse. Treatments tried: mucinex; has nebulizer at home, but hasn't used. The treatment provided mild relief.       Review of Systems   Constitutional: Positive for chills, fatigue and fever (none in 24 hours). Negative for appetite change and diaphoresis.   HENT: Positive for congestion. Negative for ear discharge, ear pain, postnasal drip, rhinorrhea, sinus pressure, sinus pain, sneezing, sore throat and trouble swallowing.    Eyes: Negative.    Respiratory: Positive for cough, chest tightness, shortness of breath and wheezing.  " "  Cardiovascular: Negative.  Negative for chest pain.   Gastrointestinal: Positive for change in bowel habit (one loose stool per day) and diarrhea. Negative for abdominal pain, anorexia, nausea and vomiting.   Genitourinary: Negative.    Musculoskeletal: Positive for myalgias. Negative for arthralgias, joint swelling and neck pain.   Skin: Negative.  Negative for rash.   Neurological: Negative for dizziness, vertigo, weakness, numbness and headaches.        Objective   Vital Signs:   /58 (BP Location: Left arm, Patient Position: Sitting)   Pulse 103   Temp 98.1 °F (36.7 °C)   Ht 177.8 cm (70\")   Wt 113 kg (248 lb 9.6 oz)   SpO2 93%   BMI 35.67 kg/m²       Physical Exam  Vitals and nursing note reviewed.   Constitutional:       General: He is not in acute distress.     Appearance: Normal appearance. He is ill-appearing.   HENT:      Head: Normocephalic and atraumatic.        Right Ear: Tympanic membrane and ear canal normal.      Left Ear: Tympanic membrane and ear canal normal.      Nose: Nose normal. No congestion.      Comments: No localized TTP over sinuses       Mouth/Throat:      Mouth: Mucous membranes are moist.      Pharynx: Oropharynx is clear. No oropharyngeal exudate or posterior oropharyngeal erythema.   Eyes:      General:         Right eye: No discharge.         Left eye: No discharge.      Conjunctiva/sclera: Conjunctivae normal.   Cardiovascular:      Rate and Rhythm: Normal rate and regular rhythm.   Pulmonary:      Effort: No respiratory distress.      Breath sounds: Wheezing present. No rhonchi or rales.      Comments: Decreased aeration with tight, wet cough noted  Dyspnea with exertion.     Not currently wearing oxygen.   Abdominal:      General: Bowel sounds are normal.      Palpations: Abdomen is soft.      Tenderness: There is no abdominal tenderness. There is no guarding or rebound.   Musculoskeletal:      Cervical back: Neck supple. No tenderness.   Lymphadenopathy:      " Cervical: No cervical adenopathy.   Skin:     General: Skin is warm and dry.   Neurological:      General: No focal deficit present.      Mental Status: He is alert and oriented to person, place, and time.   Psychiatric:         Mood and Affect: Mood normal.         Thought Content: Thought content normal.          Result Review :            XR Chest PA & Lateral    Result Date: 5/25/2022  CONCLUSION: Chronic obstructive pulmonary disease and chronic interstitial changes. No focal infiltrate. Minimal cardiomegaly. [id number] Electronically signed by:  Karson Marques MD  5/25/2022 3:28 PM CDT Workstation: [id number]  Recent Results (from the past 24 hour(s))   POCT SARS-CoV-2 Antigen DUDLEY + Flu    Collection Time: 05/25/22  2:38 PM    Specimen: Swab   Result Value Ref Range    SARS Antigen Not Detected Not Detected, Presumptive Negative    Influenza A Antigen DUDLEY Not Detected Not Detected    Influenza B Antigen DUDLEY Not Detected Not Detected    Internal Control Passed Passed    Lot Number [id number]     Expiration Date 02/08/2023           Assessment and Plan    Diagnoses and all orders for this visit:    1. COPD with lower respiratory infection (HCC) (Primary)  -     cefTRIAXone (ROCEPHIN) injection 1 g  -     methylPREDNISolone sodium succinate (SOLU-Medrol) injection 40 mg  -     azithromycin (Zithromax Z-Jone) 250 MG tablet; Take 2 tablets by mouth on day 1, then 1 tablet daily on days 2-5--begin tomorrow  Dispense: 6 tablet; Refill: 0  -     predniSONE (DELTASONE) 20 MG tablet; Take 1 tablet by mouth Daily. Begin tomorrow  Dispense: 5 tablet; Refill: 0  -     albuterol (PROVENTIL) (2.5 MG/3ML) 0.083% nebulizer solution; Take 2.5 mg by nebulization Every 4 (Four) Hours As Needed for Wheezing or Shortness of Air.  Dispense: 150 mL; Refill: 0    2. Fever, unspecified fever cause  -     POCT SARS-CoV-2 Antigen DUDLEY + Flu  -     XR Chest PA & Lateral    3. Cough  -     XR Chest PA & Lateral  -     albuterol (PROVENTIL) (2.5  MG/3ML) 0.083% nebulizer solution; Take 2.5 mg by nebulization Every 4 (Four) Hours As Needed for Wheezing or Shortness of Air.  Dispense: 150 mL; Refill: 0    4. Shortness of breath  -     XR Chest PA & Lateral  -     albuterol (PROVENTIL) (2.5 MG/3ML) 0.083% nebulizer solution; Take 2.5 mg by nebulization Every 4 (Four) Hours As Needed for Wheezing or Shortness of Air.  Dispense: 150 mL; Refill: 0    5. Mass of right submandibular region      Push fluids  Rest  Tylenol as needed  Rocephin 1 gm and Solumedrol 40 mg IM x 1 in office  Rx for Zithromax, Prednisone to begin tomorrow--cautioned about possible increase in glucose levels, continue to monitor and with diabetic meds as prescribed.   Restart albuterol neb treatments PRN--Rx given   Continue with oxygen at night, as needed  Continue with pulmonology follow ups as scheduled    Offer US of neck mass, declines, [facility].     See PCP or RTC if symptoms persist/worsen  See PCP for routine f/u visit and management of chronic medical conditions      This document has been electronically signed by KIT Cast on May 25, 2022 18:44 CDT,.

## 2022-05-31 ENCOUNTER — TELEPHONE (OUTPATIENT)
Dept: FAMILY MEDICINE CLINIC | Facility: CLINIC | Age: 77
End: 2022-05-31

## 2022-05-31 NOTE — TELEPHONE ENCOUNTER
Patient called stated he is still having head congestion. Could he get a refill on his antibiotics?

## 2022-06-01 ENCOUNTER — OFFICE VISIT (OUTPATIENT)
Dept: FAMILY MEDICINE CLINIC | Facility: CLINIC | Age: 77
End: 2022-06-01

## 2022-06-01 VITALS
TEMPERATURE: 98 F | OXYGEN SATURATION: 95 % | DIASTOLIC BLOOD PRESSURE: 68 MMHG | HEIGHT: 70 IN | BODY MASS INDEX: 35.39 KG/M2 | WEIGHT: 247.2 LBS | HEART RATE: 82 BPM | SYSTOLIC BLOOD PRESSURE: 120 MMHG

## 2022-06-01 DIAGNOSIS — R22.0 MASS OF RIGHT SUBMANDIBULAR REGION: ICD-10-CM

## 2022-06-01 DIAGNOSIS — J44.0 COPD WITH LOWER RESPIRATORY INFECTION: Primary | Chronic | ICD-10-CM

## 2022-06-01 PROCEDURE — 99213 OFFICE O/P EST LOW 20 MIN: CPT | Performed by: NURSE PRACTITIONER

## 2022-06-01 RX ORDER — COVID-19 ANTIGEN TEST
1 KIT MISCELLANEOUS TAKE AS DIRECTED
Qty: 1 KIT | Refills: 0 | COMMUNITY
Start: 2022-06-01

## 2022-06-01 RX ORDER — AZITHROMYCIN 500 MG/1
500 TABLET, FILM COATED ORAL DAILY
Qty: 5 TABLET | Refills: 0 | Status: SHIPPED | OUTPATIENT
Start: 2022-06-01

## 2022-06-01 NOTE — PROGRESS NOTES
"Chief Complaint  Sinus Problem (Congested nose, white mucus, cough. )    Subjective          Giles Parker presents to [facility] - [facility]    [facility]: [facility]    CC: \"still sick\"    Seen by me on 5- for COPD exacerbation/lower respiratory infection.  Was Covid/influenza negative.  CXR showed no pneumonia.  Treated with Rocephin, zpak, solumedrol and prednisone.  Reports he has improved, but still lingering cough, chest congestion.  [facility], [facility] this [facility].  Wearing O2 @ 2l/nc at night, 3l/nc when walking.      Illness  Chronicity: persistent. The current episode started 1 to 4 weeks ago. The problem occurs daily. The problem has been gradually improving (overal all some better, but still having a lot of chest congestion, productive cough with muvus). Associated symptoms include chest pain (occasional tightness), congestion, coughing, fatigue (some, improved) and a sore throat (scratchy). Pertinent negatives include no abdominal pain, anorexia, arthralgias, change in bowel habit, chills, diaphoresis, fever, headaches, joint swelling, myalgias, nausea, neck pain, numbness, rash, swollen glands, urinary symptoms, vertigo, visual change, vomiting or weakness. Nothing aggravates the symptoms. Treatments tried: antibiotics/steroids. The treatment provided mild relief.       Review of Systems   Constitutional: Positive for fatigue (some, improved). Negative for appetite change, chills, diaphoresis and fever.   HENT: Positive for congestion, postnasal drip, rhinorrhea and sore throat (scratchy). Negative for ear discharge, ear pain, sinus pressure, sinus pain, sneezing and trouble swallowing.    Eyes: Negative.    Respiratory: Positive for cough, chest tightness, shortness of breath and wheezing (at times).    Cardiovascular: Positive for chest pain (occasional tightness). Negative for " "palpitations and leg swelling.   Gastrointestinal: Negative.  Negative for abdominal pain, anorexia, change in bowel habit, nausea and vomiting.   Genitourinary: Negative.    Musculoskeletal: Negative.  Negative for arthralgias, joint swelling, myalgias and neck pain.   Skin: Negative.  Negative for rash.   Neurological: Negative for dizziness, vertigo, weakness, numbness and headaches.        Objective   Vital Signs:   /68 (BP Location: Right arm, Patient Position: Sitting, Cuff Size: Adult)   Pulse 82   Temp 98 °F (36.7 °C)   Ht 177.8 cm (70\")   Wt 112 kg (247 lb 3.2 oz)   SpO2 95%   BMI 35.47 kg/m²       Physical Exam  Vitals and nursing note reviewed.   Constitutional:       General: He is not in acute distress.     Appearance: Normal appearance. He is obese. He is not ill-appearing.   HENT:      Head: Normocephalic and atraumatic.      Right Ear: Tympanic membrane normal.      Left Ear: Tympanic membrane and ear canal normal.      Ears:      Comments: Right canal waxy       Nose: Congestion and rhinorrhea (clear) present.      Mouth/Throat:      Mouth: Mucous membranes are moist.      Pharynx: Posterior oropharyngeal erythema ( mild injection with PND) present. No oropharyngeal exudate.   Eyes:      General:         Right eye: No discharge.         Left eye: No discharge.      Conjunctiva/sclera: Conjunctivae normal.   Neck:      Comments: Palpable submandibular mass right--same as previous      Cardiovascular:      Rate and Rhythm: Normal rate and regular rhythm.   Pulmonary:      Effort: No respiratory distress.      Breath sounds: Wheezing and rhonchi ( scattered) present. No rales.      Comments: Labored breathing even with O2 with exertion.  Diminished throughout with scattered rhonchi, expiratory wheezing.  Congested cough.   Musculoskeletal:      Cervical back: Neck supple. No tenderness.   Lymphadenopathy:      Cervical: No cervical adenopathy.   Skin:     General: Skin is warm and dry. "   Neurological:      General: No focal deficit present.      Mental Status: He is alert and oriented to person, place, and time.   Psychiatric:         Mood and Affect: Mood normal.         Thought Content: Thought content normal.          Result Review :              Recent Results (from the past 336 hour(s))   POCT SARS-CoV-2 Antigen DUDLEY + Flu    Collection Time: 05/25/22  2:38 PM    Specimen: Swab   Result Value Ref Range    SARS Antigen Not Detected Not Detected, Presumptive Negative    Influenza A Antigen DUDLEY Not Detected Not Detected    Influenza B Antigen DUDLEY Not Detected Not Detected    Internal Control Passed Passed    Lot Number [id number]     Expiration Date 02/08/2023      XR Chest PA & Lateral    Result Date: 5/25/2022  CONCLUSION: Chronic obstructive pulmonary disease and chronic interstitial changes. No focal infiltrate. Minimal cardiomegaly. [id number] Electronically signed by:  Karson Marques MD  5/25/2022 3:28 PM CDT Workstation: [id number]         Assessment and Plan    Diagnoses and all orders for this visit:    1. COPD with lower respiratory infection (HCC) (Primary)  -     azithromycin (Zithromax) 500 MG tablet; Take 1 tablet by mouth Daily.  Dispense: 5 tablet; Refill: 0  -     COVID-19 At Home Antigen Test ([employer] At-Home Covid-19 Test) kit; 1 kit by In Vitro route Take As Directed.  Dispense: 1 kit; Refill: 0    2. Mass of right submandibular region    Rx for Zithromax 500 mg x 5 days  Continue with oxygen therapy  Continue with inhaler/nebulizers   [facility] as [facility]  At home Covid test provided if any change in symptoms    Patient wishes to defer further work up on submandibular mass--[facility] the [facility] on this    See PCP or RTC if symptoms persist/worsen  See PCP for routine f/u visit and management of chronic medical conditions      This document has been electronically signed by KIT Cast on June 1, 2022 10:39 CDT,.